# Patient Record
Sex: FEMALE | Race: WHITE | NOT HISPANIC OR LATINO | Employment: FULL TIME | URBAN - METROPOLITAN AREA
[De-identification: names, ages, dates, MRNs, and addresses within clinical notes are randomized per-mention and may not be internally consistent; named-entity substitution may affect disease eponyms.]

---

## 2023-11-16 ENCOUNTER — TELEPHONE (OUTPATIENT)
Dept: PRIMARY CARE | Facility: CLINIC | Age: 38
End: 2023-11-16
Payer: COMMERCIAL

## 2023-11-16 DIAGNOSIS — Z13.0 SCREENING FOR DEFICIENCY ANEMIA: Primary | ICD-10-CM

## 2023-11-16 DIAGNOSIS — Z13.29 SCREENING FOR THYROID DISORDER: ICD-10-CM

## 2023-11-16 DIAGNOSIS — N93.8 DYSFUNCTIONAL UTERINE BLEEDING: ICD-10-CM

## 2023-11-16 DIAGNOSIS — Z13.220 SCREENING FOR HYPERLIPIDEMIA: ICD-10-CM

## 2023-11-16 DIAGNOSIS — Z13.89 SCREENING FOR NEPHROPATHY: ICD-10-CM

## 2023-12-07 NOTE — TELEPHONE ENCOUNTER
Ferritin ordered
LMOM for patient to call office, when call is returned please give patient provider's message.    
Left detailed message on voicemail.  
Patient called in wanting to know if Kay can place lab orders for her and patient scheduled an appointment. Former [facility] patient.  
Patient called in, notified of message, patient expressed verbal understanding had no questions at this time. Pt is requesting that you put in an order for ferritin to check her iron levels as well please.    
Patient states that she needs them done sooner than February because she needs it for a fertility clinic to have her iron levels checked, etc.   
Placed. She should have them done 1 week prior to appointment. Thank you!
RELAYED MESSAGE TO THE PATIENT. WENT FOR CLARIFICATION FROM PRESCRIBER AND PATIENT WAS NO LONGER ON THE LINE.   
She may need to have two sticks then, I've never seen her and I'd want to give her feedback on what I'm seeing but it's hard to do that if I haven't seen her and am not going to see her anytime in the next few weeks. 
---

## 2024-01-31 PROBLEM — O20.0 THREATENED ABORTION (HHS-HCC): Status: ACTIVE | Noted: 2024-01-31

## 2024-01-31 PROBLEM — R00.2 PALPITATIONS: Status: ACTIVE | Noted: 2024-01-31

## 2024-01-31 PROBLEM — O02.1 MISSED ABORTION (HHS-HCC): Status: ACTIVE | Noted: 2024-01-31

## 2024-01-31 PROBLEM — G47.00 INSOMNIA: Status: ACTIVE | Noted: 2024-01-31

## 2024-01-31 PROBLEM — N93.8 DUB (DYSFUNCTIONAL UTERINE BLEEDING): Status: ACTIVE | Noted: 2024-01-31

## 2024-01-31 PROBLEM — O20.9 VAGINAL BLEEDING AFFECTING EARLY PREGNANCY (HHS-HCC): Status: ACTIVE | Noted: 2024-01-31

## 2024-01-31 RX ORDER — FLUTICASONE PROPIONATE 50 MCG
2 SPRAY, SUSPENSION (ML) NASAL AS NEEDED
COMMUNITY
Start: 2017-12-26

## 2024-01-31 RX ORDER — EPINEPHRINE 0.3 MG/.3ML
0.3 INJECTION SUBCUTANEOUS ONCE AS NEEDED
COMMUNITY
Start: 2017-12-26

## 2024-01-31 RX ORDER — VIT C/E/ZN/COPPR/LUTEIN/ZEAXAN 250MG-90MG
25 CAPSULE ORAL DAILY
COMMUNITY

## 2024-01-31 RX ORDER — MELATONIN 5 MG
CAPSULE ORAL
COMMUNITY
End: 2024-02-14 | Stop reason: ALTCHOICE

## 2024-01-31 RX ORDER — MISOPROSTOL 200 UG/1
200 TABLET ORAL 4 TIMES DAILY
COMMUNITY
Start: 2022-10-10 | End: 2024-02-14 | Stop reason: ALTCHOICE

## 2024-01-31 RX ORDER — MINERAL OIL
180 ENEMA (ML) RECTAL DAILY
COMMUNITY
Start: 2017-12-26 | End: 2024-02-14 | Stop reason: ALTCHOICE

## 2024-01-31 RX ORDER — FAMOTIDINE 20 MG/1
20 TABLET, FILM COATED ORAL AS NEEDED
COMMUNITY

## 2024-01-31 RX ORDER — PROGESTERONE 200 MG/1
200 CAPSULE ORAL DAILY
COMMUNITY
Start: 2023-06-07 | End: 2024-02-14 | Stop reason: ALTCHOICE

## 2024-01-31 RX ORDER — LETROZOLE 2.5 MG/1
2.5 TABLET, FILM COATED ORAL EVERY OTHER DAY
COMMUNITY
Start: 2023-07-27 | End: 2024-02-14 | Stop reason: WASHOUT

## 2024-01-31 RX ORDER — CETIRIZINE HYDROCHLORIDE 10 MG/1
10 TABLET ORAL
COMMUNITY

## 2024-01-31 RX ORDER — CALCIUM CARBONATE/VITAMIN D3 500-10/5ML
LIQUID (ML) ORAL
COMMUNITY

## 2024-02-13 ENCOUNTER — APPOINTMENT (OUTPATIENT)
Dept: PRIMARY CARE | Facility: CLINIC | Age: 39
End: 2024-02-13
Payer: COMMERCIAL

## 2024-02-14 ENCOUNTER — OFFICE VISIT (OUTPATIENT)
Dept: PRIMARY CARE | Facility: CLINIC | Age: 39
End: 2024-02-14
Payer: COMMERCIAL

## 2024-02-14 ENCOUNTER — APPOINTMENT (OUTPATIENT)
Dept: PRIMARY CARE | Facility: CLINIC | Age: 39
End: 2024-02-14
Payer: COMMERCIAL

## 2024-02-14 VITALS
OXYGEN SATURATION: 97 % | HEIGHT: 64 IN | TEMPERATURE: 97.8 F | BODY MASS INDEX: 24.19 KG/M2 | DIASTOLIC BLOOD PRESSURE: 78 MMHG | RESPIRATION RATE: 16 BRPM | WEIGHT: 141.7 LBS | SYSTOLIC BLOOD PRESSURE: 117 MMHG | HEART RATE: 92 BPM

## 2024-02-14 DIAGNOSIS — Z31.69 PRE-CONCEPTION COUNSELING: ICD-10-CM

## 2024-02-14 DIAGNOSIS — Z00.00 HEALTHCARE MAINTENANCE: Primary | ICD-10-CM

## 2024-02-14 PROBLEM — Z88.9: Status: ACTIVE | Noted: 2024-02-14

## 2024-02-14 PROBLEM — O20.0 THREATENED ABORTION (HHS-HCC): Status: RESOLVED | Noted: 2024-01-31 | Resolved: 2024-02-14

## 2024-02-14 PROBLEM — O02.1 MISSED ABORTION (HHS-HCC): Status: RESOLVED | Noted: 2024-01-31 | Resolved: 2024-02-14

## 2024-02-14 PROCEDURE — 99395 PREV VISIT EST AGE 18-39: CPT

## 2024-02-14 PROCEDURE — 1036F TOBACCO NON-USER: CPT

## 2024-02-14 SDOH — ECONOMIC STABILITY: FOOD INSECURITY: WITHIN THE PAST 12 MONTHS, THE FOOD YOU BOUGHT JUST DIDN'T LAST AND YOU DIDN'T HAVE MONEY TO GET MORE.: NEVER TRUE

## 2024-02-14 SDOH — ECONOMIC STABILITY: FOOD INSECURITY: WITHIN THE PAST 12 MONTHS, YOU WORRIED THAT YOUR FOOD WOULD RUN OUT BEFORE YOU GOT MONEY TO BUY MORE.: NEVER TRUE

## 2024-02-14 ASSESSMENT — LIFESTYLE VARIABLES
HOW MANY STANDARD DRINKS CONTAINING ALCOHOL DO YOU HAVE ON A TYPICAL DAY: PATIENT DOES NOT DRINK
HOW OFTEN DO YOU HAVE SIX OR MORE DRINKS ON ONE OCCASION: NEVER
AUDIT-C TOTAL SCORE: 0
SKIP TO QUESTIONS 9-10: 1
HOW OFTEN DO YOU HAVE A DRINK CONTAINING ALCOHOL: NEVER

## 2024-02-14 ASSESSMENT — ENCOUNTER SYMPTOMS
EYES NEGATIVE: 1
CARDIOVASCULAR NEGATIVE: 1
CONSTITUTIONAL NEGATIVE: 1
GASTROINTESTINAL NEGATIVE: 1
RESPIRATORY NEGATIVE: 1
PSYCHIATRIC NEGATIVE: 1
NEUROLOGICAL NEGATIVE: 1
ENDOCRINE NEGATIVE: 1
MUSCULOSKELETAL NEGATIVE: 1
HEMATOLOGIC/LYMPHATIC NEGATIVE: 1

## 2024-02-14 ASSESSMENT — PATIENT HEALTH QUESTIONNAIRE - PHQ9
2. FEELING DOWN, DEPRESSED OR HOPELESS: NOT AT ALL
1. LITTLE INTEREST OR PLEASURE IN DOING THINGS: NOT AT ALL
SUM OF ALL RESPONSES TO PHQ9 QUESTIONS 1 & 2: 0

## 2024-02-14 NOTE — PROGRESS NOTES
Subjective   Patient ID: Juju Gallegos is a 38 y.o. female who presents for CPE and visit to establish care.    Following Dr. Pimentel in [place] who is a fertility specialist and was told she has low AMH and high FSH, went through 3 rounds of IUI and did not conceive.  Has a meeting with a holistic fertility specialist who requested some additional labs.       Diet: Trying to avoid inflammatory provoking foods, no gluten, soy, corn, peanuts, dairy, eggs, caffeine, alcohol, sugar. Lots of veggies and meat, lots of fruit. Rice and potatoes. Advised to refrain from gluten until she's pregnant. Has had no caffeine for the past 3 weeks, has had very bad headaches, typically gets them around her menstrual cycle but last week had for 3 days, typically takes excedrin when she has bad headache.   Exercise: Pilates twice per week for 60 minutes per session, tries to get 10k steps per day  Weight: Down 4lbs since diet changes a few weeks ago  Water: Drinking about 60 oz per day  Sleep: OK sleep, getting about 6-8 hours per night, feels like she does better with more sleep  Social: , lives in a condo. Lives in renovated school house, 1 daughter age 3. 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    HENT: Negative.     Eyes: Negative.    Respiratory: Negative.     Cardiovascular: Negative.    Gastrointestinal: Negative.    Endocrine: Negative.    Genitourinary: Negative.    Musculoskeletal: Negative.    Skin: Negative.    Neurological: Negative.    Hematological: Negative.    Psychiatric/Behavioral: Negative.          Current Outpatient Medications   Medication Sig Dispense Refill    cetirizine (ZyrTEC) 10 mg tablet Take 1 tablet (10 mg) by mouth.      cholecalciferol (Vitamin D-3) 25 MCG (1000 UT) capsule Take 1 capsule (25 mcg) by mouth once daily.      COLOSTRUM, BOVINE ORAL Take 1  "tablet by mouth once daily.      EPINEPHrine 0.3 mg/0.3 mL injection syringe Inject 0.3 mL (0.3 mg) into the muscle 1 time if needed for anaphylaxis.      famotidine (Pepcid) 20 mg tablet Take 1 tablet (20 mg) by mouth if needed.      fluticasone (Flonase) 50 mcg/actuation nasal spray Administer 2 sprays into each nostril if needed.      GINSENG ORAL Take 1 tablet by mouth once daily.      magnesium oxide 400 mg magnesium capsule Take by mouth.      PNV no.173/iron/folate no.11 (PRENATAL ESSENTIALS ORAL) Take 1 tablet by mouth once daily.      TURMERIC ORAL Take 1 tablet by mouth once daily.       No current facility-administered medications for this visit.     Past Surgical History:   Procedure Laterality Date    OTHER SURGICAL HISTORY  2020     section low transverse    OTHER SURGICAL HISTORY  2021    Miscarriage -no D&C passed naturally     Family History   Problem Relation Name Age of Onset    No Known Problems Mother      No Known Problems Father      Heart attack Father's Brother      Other (cardiac disorder) Paternal Grandmother        Social History     Tobacco Use    Smoking status: Never    Smokeless tobacco: Never   Vaping Use    Vaping Use: Never used   Substance Use Topics    Alcohol use: Not Currently    Drug use: Never        Objective     Visit Vitals  /78 (BP Location: Left arm, Patient Position: Sitting, BP Cuff Size: Adult)   Pulse 92   Temp 36.6 °C (97.8 °F)   Resp 16   Ht 1.626 m (5' 4\")   Wt 64.3 kg (141 lb 11.2 oz)   SpO2 97%   BMI 24.32 kg/m²   OB Status Having periods   Smoking Status Never   BSA 1.7 m²        Physical Exam  Constitutional:       Appearance: Normal appearance.   HENT:      Head: Normocephalic and atraumatic.   Eyes:      Extraocular Movements: Extraocular movements intact.      Pupils: Pupils are equal, round, and reactive to light.   Cardiovascular:      Rate and Rhythm: Normal rate and regular rhythm.   Pulmonary:      Effort: Pulmonary effort is " normal.      Breath sounds: Normal breath sounds.   Abdominal:      General: Abdomen is flat. Bowel sounds are normal.      Palpations: Abdomen is soft.   Musculoskeletal:         General: Normal range of motion.   Skin:     General: Skin is warm and dry.      Capillary Refill: Capillary refill takes less than 2 seconds.   Neurological:      General: No focal deficit present.      Mental Status: She is alert and oriented to person, place, and time.   Psychiatric:         Mood and Affect: Mood normal.         Behavior: Behavior normal.           Assessment/Plan   Problem List Items Addressed This Visit       Healthcare maintenance - Primary     - Healthy diet, good quality and duration of sleep, healthy water intake, regular exercise and healthy weight discussed  Vaccines   Flu: Declined  Tdap: Last Tdap in 2020  - GYN/PAP:Following with Dr. Weiss  -Following with dentistry and optometry regularly  -Some anxiety, notices that with dietary changes feeling like anxiety better is improved  -Feeling safe at home  -Skin cancer prevention          Pre-conception counseling     Working with private health  to conceive  Requested lab orders which were placed today  Discussed that abnormal results would likely need to be discussed with GYN         Relevant Orders    FSH & LH    Antimullerian hormone (AMH)    Estradiol    Progesterone    DHEA level    Testosterone, total and free    Thyroid Peroxidase (TPO) Antibody    Anti-Thyroglobulin Antibody    Homocysteine, serum    Hemoglobin A1c       All pertinent lab work and results were reviewed with patient.     Follow up with me in 1 year     ADIN Farmer-CNS

## 2024-02-14 NOTE — ASSESSMENT & PLAN NOTE
- Healthy diet, good quality and duration of sleep, healthy water intake, regular exercise and healthy weight discussed  Vaccines   Flu: Declined  Tdap: Last Tdap in 2020  - GYN/PAP:Following with Dr. Weiss  -Following with dentistry and optometry regularly  -Some anxiety, notices that with dietary changes feeling like anxiety better is improved  -Feeling safe at home  -Skin cancer prevention

## 2024-02-14 NOTE — PATIENT INSTRUCTIONS
Thank you for coming to see me today.  If you have any questions or concerns following our visit, please contact the office.  Phone: [phone]    Follow up with me in 1 year or sooner as needed    1)  Get fasting labwork in the next 1-2 weeks.  The lab is down the [place] from our office.     2) Increase your water intake- goal water intake is 64 oz per day

## 2024-02-14 NOTE — ASSESSMENT & PLAN NOTE
Working with private health  to conceive  Requested lab orders which were placed today  Discussed that abnormal results would likely need to be discussed with GYN

## 2024-02-28 ENCOUNTER — LAB (OUTPATIENT)
Dept: LAB | Facility: LAB | Age: 39
End: 2024-02-28
Payer: COMMERCIAL

## 2024-02-28 DIAGNOSIS — Z31.69 PRE-CONCEPTION COUNSELING: ICD-10-CM

## 2024-02-28 DIAGNOSIS — Z13.89 SCREENING FOR NEPHROPATHY: ICD-10-CM

## 2024-02-28 DIAGNOSIS — N93.8 DYSFUNCTIONAL UTERINE BLEEDING: ICD-10-CM

## 2024-02-28 DIAGNOSIS — Z13.0 SCREENING FOR DEFICIENCY ANEMIA: ICD-10-CM

## 2024-02-28 DIAGNOSIS — Z13.29 SCREENING FOR THYROID DISORDER: ICD-10-CM

## 2024-02-28 DIAGNOSIS — Z13.220 SCREENING FOR HYPERLIPIDEMIA: ICD-10-CM

## 2024-02-28 LAB
25(OH)D3 SERPL-MCNC: 77 NG/ML (ref 30–100)
ALBUMIN SERPL BCP-MCNC: 4.6 G/DL (ref 3.4–5)
ALP SERPL-CCNC: 46 U/L (ref 33–110)
ALT SERPL W P-5'-P-CCNC: 11 U/L (ref 7–45)
ANION GAP SERPL CALC-SCNC: 11 MMOL/L (ref 10–20)
AST SERPL W P-5'-P-CCNC: 16 U/L (ref 9–39)
BILIRUB SERPL-MCNC: 1.1 MG/DL (ref 0–1.2)
BUN SERPL-MCNC: 13 MG/DL (ref 6–23)
CALCIUM SERPL-MCNC: 9.2 MG/DL (ref 8.6–10.3)
CHLORIDE SERPL-SCNC: 107 MMOL/L (ref 98–107)
CHOLEST SERPL-MCNC: 113 MG/DL (ref 0–199)
CHOLESTEROL/HDL RATIO: 2.7
CO2 SERPL-SCNC: 24 MMOL/L (ref 21–32)
CREAT SERPL-MCNC: 0.73 MG/DL (ref 0.5–1.05)
EGFRCR SERPLBLD CKD-EPI 2021: >90 ML/MIN/1.73M*2
ERYTHROCYTE [DISTWIDTH] IN BLOOD BY AUTOMATED COUNT: 12.5 % (ref 11.5–14.5)
EST. AVERAGE GLUCOSE BLD GHB EST-MCNC: 111 MG/DL
ESTRADIOL SERPL-MCNC: 137 PG/ML
FERRITIN SERPL-MCNC: 93 NG/ML (ref 8–150)
FSH SERPL-ACNC: 4 IU/L
GLUCOSE SERPL-MCNC: 88 MG/DL (ref 74–99)
HBA1C MFR BLD: 5.5 %
HCT VFR BLD AUTO: 43.2 % (ref 36–46)
HDLC SERPL-MCNC: 41.4 MG/DL
HGB BLD-MCNC: 14.2 G/DL (ref 12–16)
LDLC SERPL CALC-MCNC: 58 MG/DL
LH SERPL-ACNC: 4.6 IU/L
MCH RBC QN AUTO: 29.9 PG (ref 26–34)
MCHC RBC AUTO-ENTMCNC: 32.9 G/DL (ref 32–36)
MCV RBC AUTO: 91 FL (ref 80–100)
NON HDL CHOLESTEROL: 72 MG/DL (ref 0–149)
NRBC BLD-RTO: 0 /100 WBCS (ref 0–0)
PLATELET # BLD AUTO: 265 X10*3/UL (ref 150–450)
POTASSIUM SERPL-SCNC: 4 MMOL/L (ref 3.5–5.3)
PROGEST SERPL-MCNC: 8.4 NG/ML
PROT SERPL-MCNC: 7 G/DL (ref 6.4–8.2)
RBC # BLD AUTO: 4.75 X10*6/UL (ref 4–5.2)
SODIUM SERPL-SCNC: 138 MMOL/L (ref 136–145)
THYROPEROXIDASE AB SERPL-ACNC: <28 IU/ML
TRIGL SERPL-MCNC: 66 MG/DL (ref 0–149)
TSH SERPL-ACNC: 2.02 MIU/L (ref 0.44–3.98)
VLDL: 13 MG/DL (ref 0–40)
WBC # BLD AUTO: 6.4 X10*3/UL (ref 4.4–11.3)

## 2024-02-28 PROCEDURE — 85027 COMPLETE CBC AUTOMATED: CPT

## 2024-02-28 PROCEDURE — 84144 ASSAY OF PROGESTERONE: CPT

## 2024-02-28 PROCEDURE — 83036 HEMOGLOBIN GLYCOSYLATED A1C: CPT

## 2024-02-28 PROCEDURE — 84402 ASSAY OF FREE TESTOSTERONE: CPT

## 2024-02-28 PROCEDURE — 86376 MICROSOMAL ANTIBODY EACH: CPT

## 2024-02-28 PROCEDURE — 83090 ASSAY OF HOMOCYSTEINE: CPT

## 2024-02-28 PROCEDURE — 36415 COLL VENOUS BLD VENIPUNCTURE: CPT

## 2024-02-28 PROCEDURE — 86800 THYROGLOBULIN ANTIBODY: CPT

## 2024-02-28 PROCEDURE — 83516 IMMUNOASSAY NONANTIBODY: CPT

## 2024-02-28 PROCEDURE — 82670 ASSAY OF TOTAL ESTRADIOL: CPT

## 2024-02-28 PROCEDURE — 83002 ASSAY OF GONADOTROPIN (LH): CPT

## 2024-02-28 PROCEDURE — 80061 LIPID PANEL: CPT

## 2024-02-28 PROCEDURE — 82626 DEHYDROEPIANDROSTERONE: CPT

## 2024-02-28 PROCEDURE — 84443 ASSAY THYROID STIM HORMONE: CPT

## 2024-02-28 PROCEDURE — 80053 COMPREHEN METABOLIC PANEL: CPT

## 2024-02-28 PROCEDURE — 82306 VITAMIN D 25 HYDROXY: CPT

## 2024-02-28 PROCEDURE — 82728 ASSAY OF FERRITIN: CPT

## 2024-02-28 PROCEDURE — 83001 ASSAY OF GONADOTROPIN (FSH): CPT

## 2024-02-29 LAB — HCYS SERPL-SCNC: 7.66 UMOL/L (ref 5–13.9)

## 2024-03-01 LAB — THYROGLOB AB SERPL-ACNC: <0.9 IU/ML (ref 0–4)

## 2024-03-02 LAB — MIS SERPL-MCNC: 0.21 NG/ML (ref 0.18–11.71)

## 2024-03-03 LAB
DHEA SERPL-MCNC: 6.55 NG/ML (ref 1.33–7.78)
TESTOSTERONE FREE (CHAN): 2.5 PG/ML (ref 0.1–6.4)
TESTOSTERONE,TOTAL,LC-MS/MS: 22 NG/DL (ref 2–45)

## 2024-03-08 ENCOUNTER — TELEPHONE (OUTPATIENT)
Dept: PRIMARY CARE | Facility: CLINIC | Age: 39
End: 2024-03-08
Payer: COMMERCIAL

## 2024-03-10 NOTE — TELEPHONE ENCOUNTER
I sent her a [place] message about results. Long story short nothing flagging out of normal range but she should discuss the values with her conception  and OB/GYN as discussed in office.

## 2024-04-01 ENCOUNTER — TELEPHONE (OUTPATIENT)
Dept: OBSTETRICS AND GYNECOLOGY | Facility: CLINIC | Age: 39
End: 2024-04-01
Payer: COMMERCIAL

## 2024-04-01 NOTE — TELEPHONE ENCOUNTER
Patient calling stating she had a miscarriage in October and still having a lot of clotting and wanted to know if she should be having a hysterosalpingography? Stated that her friend was having the same issues as hers and had that procedure done and got pregnant the next month and wanted to know if she could benefit from this? Can we call her back? [phone]

## 2024-04-08 ENCOUNTER — APPOINTMENT (OUTPATIENT)
Dept: OBSTETRICS AND GYNECOLOGY | Facility: CLINIC | Age: 39
End: 2024-04-08
Payer: COMMERCIAL

## 2024-04-12 ENCOUNTER — LAB (OUTPATIENT)
Dept: LAB | Facility: LAB | Age: 39
End: 2024-04-12
Payer: COMMERCIAL

## 2024-04-12 ENCOUNTER — OFFICE VISIT (OUTPATIENT)
Dept: OBSTETRICS AND GYNECOLOGY | Facility: CLINIC | Age: 39
End: 2024-04-12
Payer: COMMERCIAL

## 2024-04-12 VITALS
SYSTOLIC BLOOD PRESSURE: 102 MMHG | WEIGHT: 135.4 LBS | HEIGHT: 65 IN | DIASTOLIC BLOOD PRESSURE: 68 MMHG | BODY MASS INDEX: 22.56 KG/M2

## 2024-04-12 DIAGNOSIS — N97.0 INFERTILITY ASSOCIATED WITH ANOVULATION: Primary | ICD-10-CM

## 2024-04-12 DIAGNOSIS — N97.0 INFERTILITY ASSOCIATED WITH ANOVULATION: ICD-10-CM

## 2024-04-12 LAB
BASOPHILS # BLD AUTO: 0.04 X10*3/UL (ref 0–0.1)
BASOPHILS NFR BLD AUTO: 0.7 %
EOSINOPHIL # BLD AUTO: 0.08 X10*3/UL (ref 0–0.7)
EOSINOPHIL NFR BLD AUTO: 1.3 %
ERYTHROCYTE [DISTWIDTH] IN BLOOD BY AUTOMATED COUNT: 12.9 % (ref 11.5–14.5)
HCT VFR BLD AUTO: 42.9 % (ref 36–46)
HGB BLD-MCNC: 14.5 G/DL (ref 12–16)
IMM GRANULOCYTES # BLD AUTO: 0.01 X10*3/UL (ref 0–0.7)
IMM GRANULOCYTES NFR BLD AUTO: 0.2 % (ref 0–0.9)
LYMPHOCYTES # BLD AUTO: 1.9 X10*3/UL (ref 1.2–4.8)
LYMPHOCYTES NFR BLD AUTO: 30.9 %
MCH RBC QN AUTO: 30.7 PG (ref 26–34)
MCHC RBC AUTO-ENTMCNC: 33.8 G/DL (ref 32–36)
MCV RBC AUTO: 91 FL (ref 80–100)
MONOCYTES # BLD AUTO: 0.42 X10*3/UL (ref 0.1–1)
MONOCYTES NFR BLD AUTO: 6.8 %
NEUTROPHILS # BLD AUTO: 3.69 X10*3/UL (ref 1.2–7.7)
NEUTROPHILS NFR BLD AUTO: 60.1 %
NRBC BLD-RTO: 0 /100 WBCS (ref 0–0)
PLATELET # BLD AUTO: 266 X10*3/UL (ref 150–450)
RBC # BLD AUTO: 4.73 X10*6/UL (ref 4–5.2)
T3FREE SERPL-MCNC: 3.3 PG/ML (ref 2.3–4.2)
T4 FREE SERPL-MCNC: 0.68 NG/DL (ref 0.61–1.12)
WBC # BLD AUTO: 6.1 X10*3/UL (ref 4.4–11.3)

## 2024-04-12 PROCEDURE — 85025 COMPLETE CBC W/AUTO DIFF WBC: CPT

## 2024-04-12 PROCEDURE — 99213 OFFICE O/P EST LOW 20 MIN: CPT | Performed by: OBSTETRICS & GYNECOLOGY

## 2024-04-12 PROCEDURE — 84439 ASSAY OF FREE THYROXINE: CPT

## 2024-04-12 PROCEDURE — 1036F TOBACCO NON-USER: CPT | Performed by: OBSTETRICS & GYNECOLOGY

## 2024-04-12 PROCEDURE — 84481 FREE ASSAY (FT-3): CPT

## 2024-04-12 PROCEDURE — 36415 COLL VENOUS BLD VENIPUNCTURE: CPT

## 2024-04-12 NOTE — PROGRESS NOTES
Subjective   Patient ID: Juju Gallegos is a 38 y.o. female who presents for No chief complaint on file..  HPI 38 years old  with 2 years history of secondary infertility who had a miscarriage 2022 and subsequently has had trouble getting pregnant.  She has been seen by reproductive endocrinologist and has had 3 rounds of IUI.  She says that she wants to have a hysterosalpingogram because she has heard from multiple friends that they got pregnant right after an HSG was done.  She says it appears to have been heavier since her miscarriage.  She is on prenatal vitamins and pregnant to and has modified her diet and is under the care of a reproductive endocrinologist.  She is requesting couple of tests to be done and these were ordered for her today.    Review of Systems   All other systems reviewed and are negative.      Objective   Physical Exam  Constitutional:       Appearance: Normal appearance.   Pulmonary:      Effort: Pulmonary effort is normal.   Neurological:      Mental Status: She is alert.   Psychiatric:         Mood and Affect: Mood normal.         Assessment/Plan   Problem List Items Addressed This Visit    None  Visit Diagnoses         Codes    Infertility associated with anovulation    -  Primary N97.0    Relevant Orders    [place] hysterosalpingogram    CBC and Auto Differential    Triiodothyronine, Free    Thyroxine, Free        We discussed what has been done so far, requested labs and HSG was ordered.  Follow up as needed.  Pap every 3-5 years.          Jose Weiss MD 24 10:08 AM

## 2024-04-23 ENCOUNTER — TELEMEDICINE (OUTPATIENT)
Dept: ENDOCRINOLOGY | Facility: CLINIC | Age: 39
End: 2024-04-23
Payer: COMMERCIAL

## 2024-04-23 DIAGNOSIS — Z31.41 FERTILITY TESTING: ICD-10-CM

## 2024-04-23 DIAGNOSIS — Z11.3 SCREENING FOR STDS (SEXUALLY TRANSMITTED DISEASES): Primary | ICD-10-CM

## 2024-04-23 PROCEDURE — 1036F TOBACCO NON-USER: CPT | Performed by: NURSE PRACTITIONER

## 2024-04-23 PROCEDURE — 99212 OFFICE O/P EST SF 10 MIN: CPT | Performed by: NURSE PRACTITIONER

## 2024-04-23 NOTE — PROGRESS NOTES
Virtual or Telephone Consent: An interactive audio and video telecommunication system which permits real time communications between the patient (at the originating site) and provider (at the distant site) was utilized to provide this telehealth service    Virtual Visit     NEW FERTILITY PATIENT Problem Focused VISIT (Pre Hysterosalpingogram)      Referred by: KATE Reyes This is the physician that placed the order and results will go to this physician.     Accompanied today by:  self     Patient is a  38 y.o. GP female who presents with trying to conceive x   Also complains of heavy cycles since the SAB.  Has done 3 rounds of IUI with a differeing IVF program.    Has a healthy 3.5 year old conceived easily. Tried for 9 months for the second and had an early SAB. And has been trying ever since.     Allergies to contrast media: None    Allergies to Betadine: None    Previous HSG: No       OB Hx:          GYN HISTORY:    History of STD or PID: No  Last pap smear:  normal    MENSTRUAL HISTORY:  LMP: 24    Cycles: q 25-28 days    PMH: DUB    PSH: C/S    Current Meds: see HPI    Clotting or Bleeding D/O: No    Allergies: bees     BMI: 22.67    VITALS:  not performed due to virtual visit.    ASSESSMENT   Patient is a  38 y.o. female  with h/o who presents to review history and risks of HSG procedure that OBGYN ordered.      COUNSELING  Hysterosalpingogram discussed with pt. Diagnostic procedure done in radiology to determine tubal patency and/or uterine anomalies. Schedule with our front office ([phone]) between cycle day 6-11. Take 800 mg Ibuprofen 60 minutes prior to scheduled procedure to help with uterine cramping. Complications from HSG are rare but can include allergic reaction to contrast media, pelvic infection, and/or vaginal bleeding. PI sheet provided to pt in office today.      PLAN  HSG  STD testing     FOLLOW UP   Pt has HSG scheduled for , no increased risks identified. Pt  denies allergies to betadine, contrast media, or other drug allergies. Pt denies hx of STI or chronic salpingitis. Pt aware to arrive 15 minutes early and present to [facility] office first for urine pregnancy test. Advised pt can take tylenol or ibuprofen OTC 1 hours prior to procedure.   Results will be sent or ordering physician. Pt aware to follow up with ordering physician.   Office number [phone], [address] [place] [address]

## 2024-04-29 ENCOUNTER — TELEPHONE (OUTPATIENT)
Dept: PRIMARY CARE | Facility: CLINIC | Age: 39
End: 2024-04-29
Payer: COMMERCIAL

## 2024-04-29 NOTE — TELEPHONE ENCOUNTER
Patient was in seen at  a week ago  for cough and painful ear. States it was not infected but she is not feeling any better even after the steroids. She wanted an appointment but you have no availability today.

## 2024-04-30 NOTE — TELEPHONE ENCOUNTER
Patient called in, notified of message, she did read on [place] but had another question.  Patient questioned if her cholesterol was too low.  It was suggested by her fertility Dr to ask about her low cholesterol level.

## 2024-05-02 NOTE — TELEPHONE ENCOUNTER
Norma is double booked this afternoon. Her first availability isn't until 5/8. Is it OK for patient to wait until 5/8?

## 2024-05-17 ENCOUNTER — TELEPHONE (OUTPATIENT)
Dept: OBSTETRICS AND GYNECOLOGY | Facility: CLINIC | Age: 39
End: 2024-05-17
Payer: COMMERCIAL

## 2024-05-17 NOTE — TELEPHONE ENCOUNTER
Left message on patient voicemail that per Dr. Abhishek cornelius to do HSG on day 12 of her cycle. Encouraged to call if any questions.

## 2024-05-17 NOTE — TELEPHONE ENCOUNTER
Patient of Dr. Weiss called stating she was to have a HSG on day 5 of her cycle but they cannot get her in until day 12 of her cycle and would like to know if that is ok

## 2024-06-20 DIAGNOSIS — Z01.812 ENCOUNTER FOR PREPROCEDURAL LABORATORY EXAMINATION: Primary | ICD-10-CM

## 2024-06-21 ENCOUNTER — HOSPITAL ENCOUNTER (OUTPATIENT)
Dept: RADIOLOGY | Facility: HOSPITAL | Age: 39
Discharge: HOME | End: 2024-06-21
Payer: COMMERCIAL

## 2024-06-21 ENCOUNTER — ANCILLARY PROCEDURE (OUTPATIENT)
Dept: ENDOCRINOLOGY | Facility: CLINIC | Age: 39
End: 2024-06-21
Payer: COMMERCIAL

## 2024-06-21 DIAGNOSIS — Z01.812 ENCOUNTER FOR PREPROCEDURAL LABORATORY EXAMINATION: ICD-10-CM

## 2024-06-21 DIAGNOSIS — Z31.41 FERTILITY TESTING: Primary | ICD-10-CM

## 2024-06-21 DIAGNOSIS — Z11.3 SCREENING FOR STDS (SEXUALLY TRANSMITTED DISEASES): ICD-10-CM

## 2024-06-21 DIAGNOSIS — N97.0 INFERTILITY ASSOCIATED WITH ANOVULATION: ICD-10-CM

## 2024-06-21 LAB — PREGNANCY TEST URINE, POC: NEGATIVE

## 2024-06-21 PROCEDURE — 81025 URINE PREGNANCY TEST: CPT

## 2024-06-21 PROCEDURE — 2550000001 HC RX 255 CONTRASTS: Performed by: OBSTETRICS & GYNECOLOGY

## 2024-06-21 PROCEDURE — 74740 X-RAY FEMALE GENITAL TRACT: CPT

## 2024-06-21 PROCEDURE — 58340 CATHETER FOR HYSTEROGRAPHY: CPT

## 2024-06-21 NOTE — PROGRESS NOTES
Patient ID: Juju Gallegos is a 38 y.o. female.    HSG    Date/Time: 6/21/2024 7:14 AM    Performed by: MELODIE Marquez  Authorized by: MELODIE Marquez    Consent:     Consent obtained:  Verbal and written    Consent given by:  Patient    Risks, benefits, and alternatives were discussed: yes      Risks discussed:  Bleeding, infection and pain  Universal protocol:     Procedure explained and questions answered to patient or proxy's satisfaction: yes      Relevant documents present and verified: yes      Test results available: yes      Imaging studies available: yes      Required blood products, implants, devices, and special equipment available: yes      Immediately prior to procedure, a time out was called: yes      Patient identity confirmed:  Verbally with patient, arm band and hospital-assigned identification number  Indications:     Indications:  Fertility testing  Pre-procedure details:     Skin preparation:  Povidone-iodine  Sedation:     Sedation type:  None  Anesthesia:     Anesthesia method:  None  Procedure specific details:      Assistant: AUDREY Dove CNP performed this procedure      Hysterosalpingogram (HSG) risks, benefits, alternatives, and personnel discussed with patient who agreed to proceed.    Procedural time out        Done in room where procedure done: Yes        Done just before starting procedure: Yes                                                 All members of procedural team involved in time-out: Yes                  Active communication used: Yes                                                         All team members agreed on procedure: Yes                                        Patient correctly identified by two identifiers: Yes                                  Correct side and site identified: Yes                                                     All needed special equipment/instruments available: Yes               Prior to the start of the procedure a time out was  taken and the following were verified: The identity of the patient using two patient identifiers.   Urine pregnancy test was performed and was negative.   Risks, benefits, and alternatives of the procedure were explained to the patient.  Informed consent was obtained.     The patient was placed in the dorsal lithotomy position and a sterile speculum was placed in the vagina. The cervix was sterilized with Betadine x 3. The anterior lip of the cervix was grasped with a single-tooth tenaculum. The acorn cannula was then placed in the cervix. The patient was positioned and images were taken with fluoroscopy as dye was inserted through the cannula. All instruments were then removed. The patient tolerated the procedure well and was discharged home the same day without complications.    PRELIMINARY NURSE PRACTITIONER ASSESSMENT - FOR A FINAL REPORT, PLEASE REFER TO THE FINALIZED DOCUMENTATION IN THE IMAGING TAB   Uterus:  normal contour without filling defects  Tubes:  bilateral patency with free spill of dye, normal tubal architecture noted, and no loculations present.  Based on these findings, my recommendation is: No further follow up required.   DR. Reardon to review the images. Patient is a outside HSG  from Dr. Weiss and she will reach back to her ordering provider for next steps.     The patient was counseled regarding the above preliminary findings and understands these will be reviewed by the reading physician.     Nancy Dove CNP 06/21/24 7:14 AM        Post-procedure details:     Procedure completion:  Tolerated well, no immediate complications

## 2024-06-22 LAB
C TRACH RRNA SPEC QL NAA+PROBE: NEGATIVE
N GONORRHOEA DNA SPEC QL PROBE+SIG AMP: NEGATIVE

## 2024-06-27 ENCOUNTER — TELEPHONE (OUTPATIENT)
Dept: OBSTETRICS AND GYNECOLOGY | Facility: CLINIC | Age: 39
End: 2024-06-27
Payer: COMMERCIAL

## 2024-06-27 NOTE — TELEPHONE ENCOUNTER
Patient states she had HSG procedure done on 6/21/24, she would matt to know if Dr. Weiss has read results yet?

## 2024-07-10 NOTE — TELEPHONE ENCOUNTER
Patient is 3 days late for her period, with a negative pregnancy test, she hasn't received results from her HSG yet, and isn't sure if this is effecting her period? Please advise     I did let her know results haven't been reviewed due to Dr. Weiss on vacation

## 2024-07-11 ENCOUNTER — TELEPHONE (OUTPATIENT)
Dept: ENDOCRINOLOGY | Facility: CLINIC | Age: 39
End: 2024-07-11

## 2024-07-11 NOTE — TELEPHONE ENCOUNTER
Reason for call:   Notes: Patient is 4 days passed her cycle date and hasn't started her period, took a pregnancy test and it was negative and would like to speak with a nurse

## 2024-07-11 NOTE — TELEPHONE ENCOUNTER
Patient called again is very concerned about not getting her period.  Patient really would like a phone call from the nurse.

## 2024-10-01 ENCOUNTER — APPOINTMENT (OUTPATIENT)
Dept: OBSTETRICS AND GYNECOLOGY | Facility: CLINIC | Age: 39
End: 2024-10-01
Payer: COMMERCIAL

## 2025-02-17 ENCOUNTER — OFFICE VISIT (OUTPATIENT)
Dept: OBSTETRICS AND GYNECOLOGY | Facility: CLINIC | Age: 40
End: 2025-02-17
Payer: COMMERCIAL

## 2025-02-17 ENCOUNTER — APPOINTMENT (OUTPATIENT)
Dept: PRIMARY CARE | Facility: CLINIC | Age: 40
End: 2025-02-17
Payer: COMMERCIAL

## 2025-02-17 VITALS — WEIGHT: 140 LBS | DIASTOLIC BLOOD PRESSURE: 70 MMHG | BODY MASS INDEX: 23.44 KG/M2 | SYSTOLIC BLOOD PRESSURE: 100 MMHG

## 2025-02-17 DIAGNOSIS — Z12.4 CERVICAL CANCER SCREENING: ICD-10-CM

## 2025-02-17 DIAGNOSIS — Z01.419 ENCOUNTER FOR ANNUAL ROUTINE GYNECOLOGICAL EXAMINATION: Primary | ICD-10-CM

## 2025-02-17 PROCEDURE — 1036F TOBACCO NON-USER: CPT | Performed by: OBSTETRICS & GYNECOLOGY

## 2025-02-17 PROCEDURE — 99395 PREV VISIT EST AGE 18-39: CPT | Performed by: OBSTETRICS & GYNECOLOGY

## 2025-02-17 RX ORDER — BUTYROSPERMUM PARKII(SHEA BUTTER), SIMMONDSIA CHINENSIS (JOJOBA) SEED OIL, ALOE BARBADENSIS LEAF EXTRACT .01; 1; 3.5 G/100G; G/100G; G/100G
250 LIQUID TOPICAL 2 TIMES DAILY
COMMUNITY

## 2025-02-17 SDOH — ECONOMIC STABILITY: FOOD INSECURITY: WITHIN THE PAST 12 MONTHS, THE FOOD YOU BOUGHT JUST DIDN'T LAST AND YOU DIDN'T HAVE MONEY TO GET MORE.: NEVER TRUE

## 2025-02-17 SDOH — ECONOMIC STABILITY: FOOD INSECURITY: WITHIN THE PAST 12 MONTHS, YOU WORRIED THAT YOUR FOOD WOULD RUN OUT BEFORE YOU GOT MONEY TO BUY MORE.: NEVER TRUE

## 2025-02-17 SDOH — ECONOMIC STABILITY: TRANSPORTATION INSECURITY
IN THE PAST 12 MONTHS, HAS THE LACK OF TRANSPORTATION KEPT YOU FROM MEDICAL APPOINTMENTS OR FROM GETTING MEDICATIONS?: NO

## 2025-02-17 SDOH — ECONOMIC STABILITY: TRANSPORTATION INSECURITY
IN THE PAST 12 MONTHS, HAS LACK OF TRANSPORTATION KEPT YOU FROM MEETINGS, WORK, OR FROM GETTING THINGS NEEDED FOR DAILY LIVING?: NO

## 2025-02-17 NOTE — PROGRESS NOTES
Subjective   Patient ID: Juju Gallegos is a 39 y.o. female who presents for Gynecologic Exam.  HPI 39 years old G2, P1 with secondary infertility here for annual exam.  She says she has noticed her cycles are every 28 days which is longer than before she is doing some holistic treatment at this time but had previously seen at infertility clinic.  She says she is going to try this until  May of this year and if not pregnant will go for IVF.    Review of Systems   All other systems reviewed and are negative.      Objective   Physical Exam  Vitals reviewed.   Constitutional:       Appearance: Normal appearance.   HENT:      Head: Normocephalic and atraumatic.      Nose: Nose normal.   Cardiovascular:      Rate and Rhythm: Normal rate and regular rhythm.   Pulmonary:      Effort: Pulmonary effort is normal.      Breath sounds: Normal breath sounds.   Chest:      Chest wall: No mass.   Breasts:     Right: Normal.      Left: Normal.   Abdominal:      General: Abdomen is flat. Bowel sounds are normal. There is no distension.      Palpations: Abdomen is soft. There is no mass.   Genitourinary:     General: Normal vulva.      Vagina: Normal.      Cervix: Normal.      Uterus: Normal.       Adnexa: Right adnexa normal and left adnexa normal.      Rectum: Normal.   Musculoskeletal:         General: Normal range of motion.      Cervical back: Normal range of motion.   Skin:     General: Skin is warm and dry.   Neurological:      General: No focal deficit present.      Mental Status: She is alert.   Psychiatric:         Mood and Affect: Mood normal.         Behavior: Behavior normal.         Assessment/Plan   Problem List Items Addressed This Visit    None  Visit Diagnoses         Codes    Encounter for annual routine gynecological examination    -  Primary Z01.419    Cervical cancer screening     Z12.4        Pap smear was done.  Have encouraged her to self breast exam monthly.  Follow-up as needed or yearly.         Jose  MD Abhishek 02/17/25 4:17 PM

## 2025-03-03 LAB
CYTOLOGY CMNT CVX/VAG CYTO-IMP: NORMAL
LAB AP HPV GENOTYPE QUESTION: YES
LAB AP HPV HR: NORMAL
LABORATORY COMMENT REPORT: NORMAL
LMP START DATE: NORMAL
PATH REPORT.TOTAL CANCER: NORMAL

## 2025-03-16 ENCOUNTER — PATIENT MESSAGE (OUTPATIENT)
Dept: PRIMARY CARE | Facility: CLINIC | Age: 40
End: 2025-03-16
Payer: COMMERCIAL

## 2025-03-17 ENCOUNTER — APPOINTMENT (OUTPATIENT)
Dept: PRIMARY CARE | Facility: CLINIC | Age: 40
End: 2025-03-17
Payer: COMMERCIAL

## 2025-04-15 ENCOUNTER — APPOINTMENT (OUTPATIENT)
Dept: PRIMARY CARE | Facility: CLINIC | Age: 40
End: 2025-04-15
Payer: COMMERCIAL

## 2025-04-28 ENCOUNTER — TELEPHONE (OUTPATIENT)
Dept: OBSTETRICS AND GYNECOLOGY | Facility: CLINIC | Age: 40
End: 2025-04-28
Payer: COMMERCIAL

## 2025-04-28 DIAGNOSIS — N97.0 INFERTILITY ASSOCIATED WITH ANOVULATION: ICD-10-CM

## 2025-04-28 NOTE — TELEPHONE ENCOUNTER
Patient states she tried to get a follow up scheduled with infertility, they states she needs new HSG testing done but they are scheduling far out, they told her Dr. Weiss could order testing to be done sooner, she is also asking about getting ureaplasma testing?

## 2025-06-24 ENCOUNTER — APPOINTMENT (OUTPATIENT)
Dept: OBSTETRICS AND GYNECOLOGY | Facility: CLINIC | Age: 40
End: 2025-06-24
Payer: COMMERCIAL

## 2025-06-24 VITALS — SYSTOLIC BLOOD PRESSURE: 106 MMHG | BODY MASS INDEX: 23.78 KG/M2 | WEIGHT: 142 LBS | DIASTOLIC BLOOD PRESSURE: 72 MMHG

## 2025-06-24 DIAGNOSIS — N97.0 INFERTILITY ASSOCIATED WITH ANOVULATION: ICD-10-CM

## 2025-06-24 PROCEDURE — 99213 OFFICE O/P EST LOW 20 MIN: CPT | Performed by: OBSTETRICS & GYNECOLOGY

## 2025-06-24 PROCEDURE — 1036F TOBACCO NON-USER: CPT | Performed by: OBSTETRICS & GYNECOLOGY

## 2025-06-24 NOTE — PROGRESS NOTES
Subjective   Patient ID: Juju Gallegos is a 39 y.o. female who presents for gyn (Wants tested for ureaplasma).  HPI Juju is a 39 years old with secondary infertility who is currently seen by holistic doctor and is here for mycoplasma Ureaplasma screening.  She has been researching and talking to other friends about for infertility and she wants to have this culture done.  She is thinking if this is also negative then she will go back to the infertility clinic.  We discussed this with the lab and they have recommended cervical swab with viral culturette to be done.  She had a tampon in and says she is toward the end of her flow day 4 or 5.       Review of Systems   All other systems reviewed and are negative.      Objective   Physical Exam  Constitutional:       Appearance: Normal appearance.   Pulmonary:      Effort: Pulmonary effort is normal.   Abdominal:      General: Abdomen is flat.      Palpations: Abdomen is soft.   Neurological:      Mental Status: She is alert.     Small amount of menstrual blood seen at the cervix.  Culture was done    Assessment/Plan   Problem List Items Addressed This Visit    None  Visit Diagnoses         Codes      Infertility associated with anovulation     N97.0        Culture was sent we will call her with the results.  Follow-up as needed         Jsoe Weiss MD 06/24/25 10:45 AM

## 2025-07-01 LAB
M HOMINIS SPEC QL CULT: NORMAL
SPECIMEN SOURCE: NORMAL
UREAPLASMA SPEC CULT: NORMAL

## 2025-07-31 ENCOUNTER — TELEMEDICINE (OUTPATIENT)
Dept: ENDOCRINOLOGY | Facility: CLINIC | Age: 40
End: 2025-07-31
Payer: COMMERCIAL

## 2025-07-31 VITALS — WEIGHT: 135 LBS | BODY MASS INDEX: 23.05 KG/M2 | HEIGHT: 64 IN

## 2025-07-31 DIAGNOSIS — Z01.83 ENCOUNTER FOR RH BLOOD TYPING: ICD-10-CM

## 2025-07-31 DIAGNOSIS — Z13.29 SCREENING FOR THYROID DISORDER: ICD-10-CM

## 2025-07-31 DIAGNOSIS — N97.9 FEMALE INFERTILITY: ICD-10-CM

## 2025-07-31 DIAGNOSIS — Z11.3 SCREENING FOR STDS (SEXUALLY TRANSMITTED DISEASES): ICD-10-CM

## 2025-07-31 DIAGNOSIS — Z31.41 FERTILITY TESTING: Primary | ICD-10-CM

## 2025-07-31 DIAGNOSIS — Z11.59 ENCOUNTER FOR SCREENING FOR OTHER VIRAL DISEASES: ICD-10-CM

## 2025-07-31 PROCEDURE — 3008F BODY MASS INDEX DOCD: CPT | Performed by: NURSE PRACTITIONER

## 2025-07-31 PROCEDURE — 99214 OFFICE O/P EST MOD 30 MIN: CPT | Performed by: NURSE PRACTITIONER

## 2025-07-31 PROCEDURE — 1036F TOBACCO NON-USER: CPT | Performed by: NURSE PRACTITIONER

## 2025-07-31 ASSESSMENT — COLUMBIA-SUICIDE SEVERITY RATING SCALE - C-SSRS
2. HAVE YOU ACTUALLY HAD ANY THOUGHTS OF KILLING YOURSELF?: NO
1. IN THE PAST MONTH, HAVE YOU WISHED YOU WERE DEAD OR WISHED YOU COULD GO TO SLEEP AND NOT WAKE UP?: NO
6. HAVE YOU EVER DONE ANYTHING, STARTED TO DO ANYTHING, OR PREPARED TO DO ANYTHING TO END YOUR LIFE?: NO

## 2025-07-31 ASSESSMENT — PATIENT HEALTH QUESTIONNAIRE - PHQ9
2. FEELING DOWN, DEPRESSED OR HOPELESS: NOT AT ALL
SUM OF ALL RESPONSES TO PHQ9 QUESTIONS 1 AND 2: 0
1. LITTLE INTEREST OR PLEASURE IN DOING THINGS: NOT AT ALL

## 2025-07-31 ASSESSMENT — PAIN SCALES - GENERAL: PAINLEVEL_OUTOF10: 0-NO PAIN

## 2025-07-31 NOTE — PROGRESS NOTES
Virtual or Telephone Consent: An interactive audio and video telecommunication system which permits real time communications between the patient (at the originating site) and provider (at the distant site) was utilized to provide this telehealth service  MD reviewed, No authorization to share with partner.    NEW FERTILITY PATIENT VISIT    Referred by: self  Accompanied today by: self      Juju Gallegos is a 39 y.o.  female who presents with   Infertility  Trying to conceive x 4 years.   Has a 5 year old daughter. Conceived when daughter was 4 and miscarried. Has not conceived since.     PRIOR EVALUATION / TREATMENT  3 rounds of IUI in summer of    Took clomid and letrozole as well previously with them  Hysterosalpingogram:  normal  Saline Infused Sonography: NA  GYN Pelvic Ultrasound:   Other:    Prior Labs  Lab Results    Date Done      AMH: 0.214 (Ref range: 0.176 - 11.705 ng/mL) 2024   TSH: 2.02 (Ref range: 0.44 - 3.98 mIU/L) 2024   PRL: No results found for requested labs within last 1825 days. No results found for requested labs within last 1825 days.   Testosterone: No results found for requested labs within last 1825 days. No results found for requested labs within last 1825 days.   DHEAS: No results found for requested labs within last 1825 days. No results found for requested labs within last 1825 days.   FSH: 4.0 (Ref range: IU/L) 2024   17 OHP: No results found for requested labs within last 1825 days. No results found for requested labs within last 1825 days.   HgbA1c: 5.5 (Ref range: see below %) 2024   Hepatitis B surface antigen: No results found for requested labs within last 1825 days. No results found for requested labs within last 1825 days.   Hepatitis C antibody: No results found for requested labs within last 1825 days. No results found for requested labs within last 1825 days.   HIV ½ Antigen Antibody screen with reflex: No results found for requested labs  within last 1825 days. No results found for requested labs within last 1825 days.   Syphilis screening with reflex: No results found for requested labs within last 1825 days. No results found for requested labs within last 1825 days.   GC: Negative (Ref range: Negative) 2024   CT: Negative (Ref range: Negative) 2024   Type and Screen: O 2020   Rh: NEG 2020   Antibody: No results found for requested labs within last 1825 days. No results found for requested labs within last 1825 days.   Rubella: No results found for requested labs within last 1825 days. No results found for requested labs within last 1825 days.   Varicella: No results found for requested labs within last 1825 days. No results found for requested labs within last 1825 days.   Hemoglobin: No results found for requested labs within last 1825 days. No results found for requested labs within last 1825 days.   Hematocrit: No results found for requested labs within last 1825 days. No results found for requested labs within last 1825 days.   Creatinine: 0.73 (Ref range: 0.50 - 1.05 mg/dL) 2024   AST:16 (Ref range: 9 - 39 U/L) 2024   ALT:11 (Ref range: 7 - 45 U/L): 2024      Relationship Status:     Have you ever been pregnant? Yes  How many times have you been pregnant? 2  Have you ever had a miscarriage? Yes  How many times have you had a miscarriage? 1  Passed on own     OB Hx     OB History          2    Para   1    Term   1            AB   1    Living   1         SAB   1    IAB        Ectopic        Multiple        Live Births   1                 GYN HISTORY    History of STD or PID: Yes HPV  LMP: Patient's last menstrual period was 2025.  Last pap smear:   Lab Results   Component Value Date    FINALINTERP  2025         A. THINPREP PAP CERVIX, SCREENING -     Specimen Adequacy  Satisfactory for evaluation; absence of endocervical/transformation zone component  Quality Indicator:  Partially obscuring inflammation    General Categorization  Negative for intraepithelial lesion or malignancy.    Descriptive Interpretation  Negative for intraepithelial lesion or malignancy      Specimen does not meet the requisition-stated criteria for HPV testing. See Pap test interpretation above.         History of abnormal paps: Yes, HPV  History of abnormal mammogram: No  Date of last Mammogram :  No  Coitus: irregular, every day to every other  x/fertile week    Pain with intercourse, bowel movements or full bladder: No   . Sometimes during ovulation has some discomfort/pressure during intervcourse  Pelvic pain: No    MENSTRUAL HISTORY:   Menarche:  [age]  Contraception:  pills (, college)- until age 32ish.   Cycle length:  Q 25-29 days  Bleeding length: 4-5 days   Flow :  Average    Dysmenorrhea: No   Gets menstrual migraines  Cramping has improved since working with holistic [facility] doctor.     ENDOCRINE HISTORY  Nipple Discharge: No  Vision changes: No  Headaches: Yes  Excess hair growth: No  Acne: No with cycle  Oily skin:No  Recent weight change: No  Significant exercise history: No  History of eating disorder: No    PMH  Medical History[1]     MEDICATIONS  Medications Ordered Prior to Encounter[2]    PSH  Surgical History[3]     PSYCH HISTORY  Past psych history: Yes anxiety  Prior hospitalization for mental health disorder: No     SOCIAL HISTORY  Occupation:   Social History[4]  History of incarceration: No  History of domestic violence: No  History of  incest or rape: No     PARTNER HISTORY    Partner: Name: Israel Gallegos  : 3/2/1988  Told normal counts but possibly motility issue.     FAMILY HISTORY   Family History[5]  Family History of Blood Clots: No  Family history of breast, ovary, colon, endometrial cancer: No    GENETIC HISTORY  Ethnic background patient: White  Ethnic background partner: White  Genetic Disease in Family: Muscular dystrophy passed at 10 months in  "husbands sister.- sent message to Diana to confirm about a consult  Birth Defects in Family: No  Genetic screening performed previously: No   Yes, CF negative, SMA 3 copies- sent  message to Diana to follow up  BMI:   BMI Readings from Last 1 Encounters:   25 23.17 kg/m²     VITALS:  Ht 1.626 m (5' 4\")   Wt 61.2 kg (135 lb)   LMP 2025   BMI 23.17 kg/m²   LMP: Patient's last menstrual period was 2025.    ASSESSMENT   39 y.o.  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    Partner SA: per patient possibly low motility    COUNSELING  We discussed causes of infertility including hormonal, egg quality issues, structural problems such as endometriosis, adhesions, or tubal problems, uterine factors such as polyps or fibroids, and sperm issues. Reviewed evaluation of such as well. We discussed various methods for achieving pregnancy in some detail including, ovulation induction, insemination, superovulation and IVF.    Reviewed pregnancy percentages with oral medications, IUI, SO and IVF. Aware best chance for pregnancy would be with an egg donor. Can schedule IVF consult to hear percentages and possibly move forward with this process.        Routine Testing  Fertility Center  STDs Within 1 year   Genetic carrier Waiver/Completed   T&S Within 1 year   AMH Within 1 year   TSH Within 1 year   Rubella/Varicella Within 5 years     BMI Testing  Fertility Center  CBC Within 1 year   CMP Within 1 year   HgbA1c Within 1 year   Mag, Phos, Vit D <18 Within 1 year   MFM > 40  REQ   Wt loss consult > 40 OPT     PLAN  Orders Placed This Encounter   Procedures    US pelvis transvaginal    QUEST ANTI-MULLERIAN HORMONE (AMH), FEMALE    TSH with reflex to Free T4 if " abnormal    Type And Screen Is this order related to pregnancy or an upcoming surgery? No    Rubella Antibody, Igg    Varicella Zoster Antibody, Igg    Hepatitis B surface antigen    Hepatitis C Antibody    HIV 1/2 Antigen/Antibody Screen with Reflex to Confirmation    Syphilis Screen with Reflex    C. trachomatis / N. gonorrhoeae, Amplified, Urogenital    Referral to Financial Planning   Encouarged [employer] given hx in husbands side of the family.  Call cd 1 to schedule pelvic.    GENETIC SCREENING PATIENT  NA they are thinking about it    PARTNER- needs appointment  Yes Semen Analysis: no results to review  NA    FOLLOW UP   Consults: possibly genetics due to husbands family being carriers of MD and sister had this, also for SMA 3 copies- sent Diana a message and will get back to patient to confirm.  Chart to primary nurse for care coordination and patient check list/education  Enroll in Engaged MD  Take prenatal vitamins, vitamin D 2000 IUs daily  Discussed that pap and mammogram must be updated per ACOG guidelines before treatment can begin  Discussed that treatment cannot proceed until checklist items are complete   6 week follow up with GIANCARLO  Additional testing for BMI < 18 or > 40: NA  Schedule IVF consullt.  Partner to schedule visit for SA and STD's and possibly genetic screening through [employer].    MD Completion:  Ectopic Risk: No  Medically Complex: No    Fertility Plan Update:TBD    Intimate Exam Performed: No, an intimate exam was not performed at this encounter.     Shamika Pederson  2025  3:30 PM         [1]   Past Medical History:  Diagnosis Date    Missed  ([facility]) 2024    Personal history of other mental and behavioral disorders     History of anxiety    Threatened  ([facility] [address]) 2024   [2]   Current Outpatient Medications on File Prior to Visit   Medication Sig Dispense Refill    cholecalciferol (Vitamin D-3) 25 MCG (1000 UT) capsule Take 1 capsule (25 mcg) by mouth once  daily.      EPINEPHrine 0.3 mg/0.3 mL injection syringe Inject 0.3 mL (0.3 mg) into the muscle 1 time if needed for anaphylaxis.      PNV no.173/iron/folate no.11 (PRENATAL ESSENTIALS ORAL) Take 1 tablet by mouth once daily.      saccharomyces boulardii (Florastor) 250 mg capsule Take 1 capsule (250 mg) by mouth 2 times a day.       No current facility-administered medications on file prior to visit.   [3]   Past Surgical History:  Procedure Laterality Date    OTHER SURGICAL HISTORY  2020     section low transverse    OTHER SURGICAL HISTORY  2021    Miscarriage -no D&C passed naturally   [4]   Social History  Tobacco Use    Smoking status: Never    Smokeless tobacco: Never   Vaping Use    Vaping status: Never Used   Substance Use Topics    Alcohol use: Not Currently    Drug use: Never   [5]   Family History  Problem Relation Name Age of Onset    No Known Problems Mother      No Known Problems Father      Heart attack Father's Brother      Other (cardiac disorder) Paternal Grandmother

## 2025-08-04 ENCOUNTER — DOCUMENTATION (OUTPATIENT)
Dept: ENDOCRINOLOGY | Facility: CLINIC | Age: 40
End: 2025-08-04
Payer: COMMERCIAL

## 2025-08-04 NOTE — PROGRESS NOTES
Alicia Shabazz [facility]  MELODIE Rico  3 copies of SMA is not a concern, but yes we would see them due to the family history of muscular dystrophy! If they know what kind or have the family members test results that would be most helpful!    Alicia          Previous Messages       ----- Message -----  From: MELODIE Rico  Sent: 7/31/2025   4:05 PM EDT  To: Alicia Shabazz [facility]    Patient had CF and SMA testing with pregnancy in 2020. Her SMA had 3 copies. Does she have increased risks with this? And need counseling?  Her husbands sister also had muscular dystrophy (both his parents were carriers). They should be referred to you for testing correct?    MELODIE Rico    Noted. Will send patient a message.

## 2025-08-22 ENCOUNTER — APPOINTMENT (OUTPATIENT)
Dept: PRIMARY CARE | Facility: CLINIC | Age: 40
End: 2025-08-22
Payer: COMMERCIAL

## 2026-02-23 ENCOUNTER — APPOINTMENT (OUTPATIENT)
Dept: OBSTETRICS AND GYNECOLOGY | Facility: CLINIC | Age: 41
End: 2026-02-23
Payer: COMMERCIAL